# Patient Record
Sex: FEMALE | Race: OTHER
[De-identification: names, ages, dates, MRNs, and addresses within clinical notes are randomized per-mention and may not be internally consistent; named-entity substitution may affect disease eponyms.]

---

## 2020-03-17 ENCOUNTER — HOSPITAL ENCOUNTER (EMERGENCY)
Dept: HOSPITAL 15 - ER | Age: 20
Discharge: HOME | End: 2020-03-17
Payer: SELF-PAY

## 2020-03-17 VITALS — WEIGHT: 155 LBS | HEIGHT: 64 IN | BODY MASS INDEX: 26.46 KG/M2

## 2020-03-17 VITALS — SYSTOLIC BLOOD PRESSURE: 135 MMHG | DIASTOLIC BLOOD PRESSURE: 74 MMHG

## 2020-03-17 DIAGNOSIS — J06.9: Primary | ICD-10-CM

## 2025-03-10 ENCOUNTER — HOSPITAL ENCOUNTER (EMERGENCY)
Dept: HOSPITAL 15 - ER | Age: 25
Discharge: HOME | End: 2025-03-10
Payer: SELF-PAY

## 2025-03-10 VITALS — WEIGHT: 143.3 LBS | BODY MASS INDEX: 24.46 KG/M2 | HEIGHT: 64 IN

## 2025-03-10 VITALS
DIASTOLIC BLOOD PRESSURE: 77 MMHG | HEART RATE: 109 BPM | TEMPERATURE: 98.1 F | SYSTOLIC BLOOD PRESSURE: 131 MMHG | OXYGEN SATURATION: 98 % | RESPIRATION RATE: 18 BRPM

## 2025-03-10 VITALS — RESPIRATION RATE: 18 BRPM | HEART RATE: 109 BPM | OXYGEN SATURATION: 98 %

## 2025-03-10 DIAGNOSIS — N39.0: Primary | ICD-10-CM

## 2025-03-10 LAB
CRYSTALS UR QL AUTO: (no result) /HPF
PROT UR STRIP.AUTO-MCNC: (no result) MG/DL

## 2025-03-10 PROCEDURE — 81001 URINALYSIS AUTO W/SCOPE: CPT

## 2025-03-10 PROCEDURE — 81025 URINE PREGNANCY TEST: CPT

## 2025-03-10 RX ADMIN — ACETAMINOPHEN ONE MG: 325 TABLET ORAL at 10:36

## 2025-03-10 NOTE — ED.PDOC
GI ASSESSMENT


HPI Comments


24 y.o female presents to the ED for a chief complaint of lower abdominal pain 

that started 2-3 days ago but progressively worsened associated with dysuria. 

Patient describes pain as sharp, constant, non radiating and rating a 9/10 on 

the pain scale. Patient denies any nausea, vomiting, diarrhea, fever, chills, 

hematuria or rectal bleeding. Patient took an at home pregnancy test one week 

ago and resulted negative. LMC was around 2/14/25 but is unsure due to irregular

menstrual cycles. She denies any medical, surgical history or allergies. She 

reports occasional use of alcohol.


Chief Complaint:  Abdominal Pain


Time Seen by MD:  10:20


Primary Care Provider:  NONE


Reviewed Notes:  Nurses Notes, Medications, Allergies


Allergies:  


Coded Allergies:  


     NO KNOWN ALLERGIES (Unverified , 3/17/20)


Home Meds


Active Scripts


Ciprofloxacin Hcl (Cipro) 500 Mg Tab, 1 TAB PO BID, #14 TAB


   Prov:ZOË DURAN MD         3/10/25


Information Source:  Patient


Mode of Arrival:  Ambulatory


Timing:  Days (2-3)


Duration:  Since onset


Quality:  Sharp


Vomitus:  None


Stool:  Normal


Severity:  Moderate


Recent:  None


Recent Hx of:  None


Pain Location:  Suprapubic


Modifying Factors:  Nothing


Associated sign and symptoms:  Abdominal Pain





Past Medical History


PAST MEDICAL HISTORY:  Denies


Surgical History:  Denies all surgeries


GYN History:  Denies all GYN Hx





Family History


Family History:  Reviewed,noncontributory to illness





Social History


Smoker:  Non-Smoker


Alcohol:  Occasionally


Drugs:  Denies Drug Use


Lives In:  Home





Constitutional:  denies: chills, diaphoresis, fatigue, fever, malaise, sweats, 

weakness, others


EENTM:  denies: blurred vision, double vision, ear bleeding, ear discharge, ear 

drainage, ear pain, ear ringing, eye pain, eye redness, hearing loss, mouth 

pain, mouth swelling, nasal discharge, nose bleeding, nose congestion, nose 

pain, photophobia, tearing, throat pain, throat swelling, voice changes, others


Respiratory:  denies: cough, hemoptysis, orthopnea, SOB at rest, shortness of 

breath, SOB with excertion, stridor, wheezing, others


Cardiovascular:  denies: chest pain, dizzy spells, diaphoresis, Dyspnea on 

exertion, edema, irregular heart beat, left arm pain, lightheadedness, 

palpitations, PND, syncope, others


Gastrointestinal:  reports: abdominal pain; denies: abdomen distended, blood 

streaked bowels, constipated, diarrhea, dysphagia, difficulty swallowing, 

hematemesis, melena, nausea, poor appetite, poor fluid intake, rectal bleeding, 

rectal pain, vomiting, others


Genitourinary:  reports: dysuria; denies: abnormal vagina bleeding, burning, 

dyspareunia, flank pain, frequency, hematuria, incontinence, pain, pregnant, 

vagina discharge, urgency, others


Neurological:  denies: dizziness, fainting, headache, left sided numbness, left 

sided weakness, numbness, paresthesia, pre-existing deficit, right sided 

numbness, right sided weakness, seizure, speech problems, tingling, tremors, 

weakness, others


Musculoskeletal:  denies: back pain, gout, joint pain, joint swelling, muscle 

pain, muscle stiffness, neck pain, others


Integumetry:  denies: bruises, change in color, change in hair/nails, dryness, 

laceration, lesions, lumps, rash, wounds, others


Allergic/Immunocompromised:  denies: Difficulty Healing, Frequent Infections, 

Hives, Itching, others


Hematologic/Lymphatic:  denies: anemia, blood clots, easy bleeding, easy 

bruising, swollen glands, others


Endocrine:  denies: excessive hunger, excessive sweating, excessive thirst, 

excessive urination, flushing, intolerance to cold, intolerance to heat, 

unexplained weight gain, unexplained weight loss, others


Psychiatric:  denies: anxiety, bipolar disorder, depression, hopeless, panic 

disorder, schizophrenia, sleepless, suicidal, others


All Other Systems:  Reviewed and Negative





Physical Exam


General Appearance:  Mild Distress


HEENT:  Normal ENT Inspection, Pharynx Normal, TMs Normal


Neck:  Full Range of Motion, Non-Tender, Normal, Normal Inspection


Respiratory:  Chest Non-Tender, Lungs Clear, No Accessory Muscle Use, No 

Respiratory Distress, Normal Breath Sounds


Cardiovascular:  No Edema, No JVD, No Murmur, No Gallop, Normal Peripheral 

Pulses, Regular Rate/Rhythm


Breast Exam:  Deferred


Gastrointestinal:  No Organomegaly, No Pulsatile Mass, Normal Bowel Sounds, 

Soft, Suprapubic, Tenderness


Genitalia:  Deferred


Pelvic:  Deferred


Rectal:  Deferred


Extremities:  No calf tenderness, Normal capillary refill, Normal inspection, 

Normal range of motion, Non-tender, No pedal edema


Musculoskeletal :  


   Apperance:  Normal


Neurologic:  Alert, CNs II-XII nml as Tested, No Motor Deficits, Normal Affect, 

Normal Mood, No Sensory Deficits


Cerebellar Function:  Normal


Reflexes:  Normal


Skin:  Dry, Normal Color, Warm


Lymphatic:  No Adenopathy





Was a procedure done?


Was a procedure done?:  No





GI differential Dx


Differential Diagnosis:  Esophagitis, Gastroenteritis, Food Poisoning, 

Pregnancy, Viral





X-Ray, Labs, Meds, VS





                                   Vital Signs








  Date Time  Temp Pulse Resp B/P (MAP) Pulse Ox O2 Delivery O2 Flow Rate FiO2


 


3/10/25 10:40 98.1 109 18 131/77 (95) 98   





 98.1       


 


3/10/25 10:38  109 18  98 Room Air* 0 21


 


3/10/25 10:25 97.7 105 17 117/88 (98) 96   








                                       Lab








Test


 3/10/25


10:31 Range/Units


 


 


Urine Color Yellow  Yellow  


 


Urine Clarity Hazy H Clear  


 


Urine pH 5.5  5.0-9.0  


 


Urine Specific Gravity 1.030  1.001-1.035  


 


Urine Protein Trace H Negative  


 


Urine Ketones Negative  Negative  


 


Urine Blood Negative  Negative  /uL


 


Urine Nitrite Negative  Negative  


 


Urine Bilirubin Negative  Negative  


 


Urine Urobilinogen Normal  Negative  mg/dL


 


Urine Leukocyte Esterase 1+  Negative  /uL


 


Urine RBC 1  0 - 4  /hpf


 


Urine Microscopic WBC 4  0-5  /HPF


 


Urine Squamous Epithelial


Cells Mod 


 <5  /hpf





 


Urine Amorphous Crystals Few  None Seen  /hpf


 


Urine Bacteria Few H None Seen  /hpf


 


Urine Mucus Few  None Seen  


 


Urine Glucose Normal  Normal  mg/dL


 


Urine Pregnancy Test Negative  Negative  








                               Current Medications








 Medications


  (Trade)  Dose


 Ordered  Sig/Grayson


 Route  Start Time


 Stop Time Status Last Admin


 


 Acetaminophen


  (Tylenol Tablet)  650 mg  ONCE  ONCE


 PO  3/10/25 10:30


 3/10/25 10:31 DC 3/10/25 10:36








The urine test is positive for UTI


The patient was given acetaminophen 650 mg by mouth for the pain 


The pregnancy test is negative


The patient was being discharged with a diagnosis of UTI 


The patient will return to the emergency department's the condition worsens


The patient was given a prescription of Cipro


Time of 1ST Reevaluation:  10:28


Reevaluation 1ST:  Unchanged


Patient Education/Counseling:  Diagnosis, Treatment, Prognosis, Need For Follow 

Up


Family Education/Counseling:  No Family Present





Departure 1


Departure


Time of Disposition:  11:11


Impression:  


   Primary Impression:  


   Urinary tract infection


   Qualified Codes:  N30.00 - Acute cystitis without hematuria


Disposition:  01 HOME / SELF CARE / HOMELESS


Condition:  Fair


e-Prescriptions


Ciprofloxacin Hcl (Cipro) 500 Mg Tab


1 TAB PO BID, #14 TAB


   Prov: ZOË DURAN MD         3/10/25


Discharged With:  Self





Critical Care Note


Critical Care Time?:  No





Stability


Stability form required:  No





Heart Score


Heart Score:  








Heart Score Response (Comments) Value


 


History N/A 0


 


EKG N/A 0


 


Age N/A 0


 


Risk Factors N/A 0


 


Troponin N/A 0


 


Total  0














I personally scribed for ZOË DURAN MD ([email or web address]) on 3/10/25 at 10:28.  

Electronically submitted by Irena Briceno ([facility]).





ZOË DURAN MD              Mar 10, 2025 10:28

## 2025-03-25 ENCOUNTER — HOSPITAL ENCOUNTER (EMERGENCY)
Dept: HOSPITAL 15 - ER | Age: 25
Discharge: HOME | End: 2025-03-25
Payer: SELF-PAY

## 2025-03-25 VITALS
SYSTOLIC BLOOD PRESSURE: 133 MMHG | OXYGEN SATURATION: 97 % | TEMPERATURE: 98.9 F | DIASTOLIC BLOOD PRESSURE: 98 MMHG | HEART RATE: 109 BPM

## 2025-03-25 VITALS — RESPIRATION RATE: 14 BRPM

## 2025-03-25 VITALS — BODY MASS INDEX: 33.99 KG/M2 | HEIGHT: 64 IN | WEIGHT: 199.08 LBS

## 2025-03-25 DIAGNOSIS — E66.9: ICD-10-CM

## 2025-03-25 DIAGNOSIS — N85.9: Primary | ICD-10-CM

## 2025-03-25 DIAGNOSIS — Z32.02: ICD-10-CM

## 2025-03-25 DIAGNOSIS — Z87.440: ICD-10-CM

## 2025-03-25 LAB
ANION GAP SERPL CALCULATED.3IONS-SCNC: 10 MMOL/L (ref 5–15)
BUN SERPL-MCNC: 11 MG/DL (ref 9–23)
BUN/CREAT SERPL: 13.6 (ref 10–20)
CALCIUM SERPL-MCNC: 10 MG/DL (ref 8.7–10.4)
CHLORIDE SERPL-SCNC: 105 MMOL/L (ref 98–107)
CO2 SERPL-SCNC: 26 MMOL/L (ref 20–31)
GLUCOSE SERPL-MCNC: 92 MG/DL (ref 74–106)
HCT VFR BLD AUTO: 39.2 % (ref 36–46)
HGB BLD-MCNC: 13.4 G/DL (ref 12.2–16.2)
MCH RBC QN AUTO: 28.7 PG (ref 28–32)
MCV RBC AUTO: 84 FL (ref 80–100)
NRBC BLD QL AUTO: 0.2 %
POTASSIUM SERPL-SCNC: 3.7 MMOL/L (ref 3.5–5.1)
SODIUM SERPL-SCNC: 141 MMOL/L (ref 136–145)

## 2025-03-25 PROCEDURE — 85025 COMPLETE CBC W/AUTO DIFF WBC: CPT

## 2025-03-25 PROCEDURE — 74176 CT ABD & PELVIS W/O CONTRAST: CPT

## 2025-03-25 PROCEDURE — 36415 COLL VENOUS BLD VENIPUNCTURE: CPT

## 2025-03-25 PROCEDURE — 80048 BASIC METABOLIC PNL TOTAL CA: CPT

## 2025-03-25 PROCEDURE — 81001 URINALYSIS AUTO W/SCOPE: CPT

## 2025-03-25 PROCEDURE — 81025 URINE PREGNANCY TEST: CPT

## 2025-03-25 NOTE — DVH
Exam: CT CT AB PEL WO CON-NO ORAL OR IV



History: rlq pain



Comparison Study: None



TECHNIQUE: Multidetector CT of the abdomen and pelvis was performed from lung bases to pubic symphysi
s. Imaging was performed without IV contrast. Axial, coronal, and sagittal multiplanar reformats were
 obtained from the axial data set by the technologist.



RADIATION DOSE: DLP  711.5 mGy.cm; CTDI vol  13.5 mGy.



Findings:



Limited evaluation given noncontrast technique. 



Lungs: The lung bases are clear.



Heart: No cardiomegaly or pericardial effusion.



Liver: Unremarkable.



Gallbladder: Unremarkable.



Spleen: Unremarkable



Pancreas: Unremarkable



Adrenals: Unremarkable



Kidneys: Unremarkable



GI tract: Unremarkable.  Normal appendix.



: 10.7 x 9.4 cm lesion along the uterine fundus which is not well characterized given noncontrast t
echnique. An intrauterine device is noted in the uterine fundus.



Vasculature: Unremarkable



Lymphadenopathy: Absent



Peritoneum: No ascites



Musculoskeletal: Unremarkable



Soft tissues: Unremarkable



Impression: 



1. Limited evaluation given noncontrast technique. 



2. Normal appendix.



3. 10.7 x 9.4 cm uterine lesion which is not well characterized given noncontrast technique. Recommen
d pelvic ultrasound or contrast-enhanced MRI for further evaluation.



Electronically Signed by: Vira Howard at 03/25/2025 21:24:36 PM

## 2025-03-25 NOTE — ED.PDOC
History of Present Illness


HPI Comments


25 y/o obese F, with only history of occasional alcohol use, presents with c/o 

bilateral, lower quadrant abdominal pain, today. Patient endorses onset of 

symptoms that began, initially, with RLQ pain following recent visit for UTI 2x 

weeks ago. She reports on additional LLQ abdominal pain, today. Patient rates 

pain severity a 6/10. She also endorses on medication compliancy with 

antibiotics she was placed on following said UTI-related visit. She reports no 

recent injuries, sick contact, spoiled food, or additional relevant 

informations, with exception of her period being late than usual amidst recent 

negative at-home urine pregnancy test. Patient denies any nausea, vomiting, 

urinary symptoms, fever, chills, or other associated symptoms at this time.


Time Seen by MD:  20:20


Primary Care Provider:  none


Reviewed Notes:  Nurses Notes, Medications, Allergies


Allergies:  


Coded Allergies:  


     NO KNOWN ALLERGIES (Unverified , 3/17/20)


Home Meds


Active Scripts


Ciprofloxacin Hcl (Cipro) 500 Mg Tab, 1 TAB PO BID, #14 TAB


   Prov:ZOË DURAN MD         3/10/25


Information Source:  Patient


Mode of Arrival:  Ambulatory


Severity:  Moderate


Timing:  Weeks


Duration:  Since onset


Prehospital treatment:  Treatment (antibiotics )





Past Medical History


Past Medical History (Other):  


obesity


Surgical History:  Denies all surgeries


GYN History:  Denies all GYN Hx





Family History


Family History:  Reviewed,noncontributory to illness





Social History


Smoker:  Non-Smoker


Alcohol:  Occasionally


Drugs:  Denies Drug Use


Lives In:  Home





Constitutional:  denies: chills, diaphoresis, fatigue, fever, malaise, sweats, 

weakness, others


EENTM:  denies: blurred vision, double vision, ear bleeding, ear discharge, ear 

drainage, ear pain, ear ringing, eye pain, eye redness, hearing loss, mouth 

pain, mouth swelling, nasal discharge, nose bleeding, nose congestion, nose 

pain, photophobia, tearing, throat pain, throat swelling, voice changes, others


Respiratory:  denies: cough, hemoptysis, orthopnea, SOB at rest, shortness of 

breath, SOB with excertion, stridor, wheezing, others


Cardiovascular:  denies: chest pain, dizzy spells, diaphoresis, Dyspnea on 

exertion, edema, irregular heart beat, left arm pain, lightheadedness, palpit

ations, PND, syncope, others


Gastrointestinal:  reports: abdominal pain; denies: abdomen distended, blood 

streaked bowels, constipated, diarrhea, dysphagia, difficulty swallowing, 

hematemesis, melena, nausea, poor appetite, poor fluid intake, rectal bleeding, 

rectal pain, vomiting, others


Genitourinary:  denies: abnormal vagina bleeding, burning, dyspareunia, dysuria,

flank pain, frequency, hematuria, incontinence, pain, pregnant, vagina dischar

ge, urgency, others


Neurological:  denies: dizziness, fainting, headache, left sided numbness, left 

sided weakness, numbness, paresthesia, pre-existing deficit, right sided 

numbness, right sided weakness, seizure, speech problems, tingling, tremors, 

weakness, others


Musculoskeletal:  denies: back pain, gout, joint pain, joint swelling, muscle 

pain, muscle stiffness, neck pain, others


Integumetry:  denies: bruises, change in color, change in hair/nails, dryness, 

laceration, lesions, lumps, rash, wounds, others


Allergic/Immunocompromised:  denies: Difficulty Healing, Frequent Infections, 

Hives, Itching, others


Hematologic/Lymphatic:  denies: anemia, blood clots, easy bleeding, easy 

bruising, swollen glands, others


Endocrine:  denies: excessive hunger, excessive sweating, excessive thirst, 

excessive urination, flushing, intolerance to cold, intolerance to heat, 

unexplained weight gain, unexplained weight loss, others


Psychiatric:  denies: anxiety, bipolar disorder, depression, hopeless, panic 

disorder, schizophrenia, sleepless, suicidal, others


All Other Systems:  Reviewed and Negative





Physical Exam


General Appearance:  Moderate Distress


HEENT:  Normal ENT Inspection, Pharynx Normal, TMs Normal


Neck:  Full Range of Motion, Non-Tender, Normal, Normal Inspection


Respiratory:  Chest Non-Tender, Lungs Clear, No Accessory Muscle Use, No 

Respiratory Distress, Normal Breath Sounds


Cardiovascular:  No Edema, No JVD, No Murmur, No Gallop, Normal Peripheral 

Pulses, Regular Rate/Rhythm


Breast Exam:  Deferred


Gastrointestinal:  No Organomegaly, No Pulsatile Mass, Normal Bowel Sounds, RLQ,

Soft, Tenderness


Genitalia:  Deferred


Pelvic:  Deferred


Rectal:  Deferred


Extremities:  No calf tenderness, Normal capillary refill, Normal inspection, 

Normal range of motion, Non-tender, No pedal edema


Musculoskeletal :  


   Apperance:  Normal


Neurologic:  Alert, CNs II-XII nml as Tested, No Motor Deficits, Normal Affect, 

Normal Mood, No Sensory Deficits


Cerebellar Function:  Normal


Reflexes:  Normal


Skin:  Dry, Normal Color, Warm


Lymphatic:  No Adenopathy





Was a procedure done?


Was a procedure done?:  No





Differential Dx


Considerations may include:


nephrolithiasis, UTI, ovarian cysts, ovarian torsion, pregnancy, PID, viral 

syndrome, among others





X-Ray, Labs, Meds, VS





                                   Vital Signs








  Date Time  Temp Pulse Resp B/P (MAP) Pulse Ox O2 Delivery O2 Flow Rate FiO2


 


3/25/25 20:25 98.9 109 16 133/98 (110) 97   





 98.9       








                                       Lab








Test


 3/25/25


20:30 3/25/25


20:26 Range/Units


 


 


White Blood Count Pending    


 


Red Blood Count Pending    


 


Hemoglobin Pending    


 


Hematocrit Pending    


 


Mean Corpuscular Volume Pending    


 


Mean Corpuscular Hemoglobin Pending    


 


Mean Corpuscular Hemoglobin


Concent Pending 


 


  





 


Red Cell Distribution Width Pending    


 


Platelet Count Pending    


 


Mean Platelet Volume Pending    


 


Neutrophils (%) (Auto) Pending    


 


Lymphocytes (%) (Auto) Pending    


 


Monocytes (%) (Auto) Pending    


 


Basophils (%) (Auto) Pending    


 


Neutrophils # (Auto) Pending    


 


Lymphocytes # (Auto) Pending    


 


Monocytes # (Auto) Pending    


 


Sodium Level 141   136-145  mmol/L


 


Potassium Level 3.7   3.5-5.1  mmol/L


 


Chloride Level 105     mmol/L


 


Carbon Dioxide Level 26   20-31  mmol/L


 


Anion Gap 10   5-15  


 


Blood Urea Nitrogen 11   9-23  mg/dL


 


Creatinine


 0.81 


 


 0.550-1.02


mg/dL


 


Glomerular Filtration Rate


Calc 103 


 


 >90  mL/min





 


BUN/Creatinine Ratio 13.6   10.0-20.0  


 


Serum Glucose 92     mg/dL


 


Calcium Level 10.0   8.7-10.4  mg/dL


 


Urine Color  Light-yellow  Yellow  


 


Urine Clarity  Turbid H Clear  


 


Urine pH  6.0  5.0-9.0  


 


Urine Specific Gravity  1.023  1.001-1.035  


 


Urine Protein  Negative  Negative  


 


Urine Ketones  1+ H Negative  


 


Urine Blood  Negative  Negative  /uL


 


Urine Nitrite  Negative  Negative  


 


Urine Bilirubin  Negative  Negative  


 


Urine Urobilinogen  Normal  Negative  mg/dL


 


Urine Leukocyte Esterase  Trace  Negative  /uL


 


Urine RBC  2  0 - 4  /hpf


 


Urine Microscopic WBC  < 1  0-5  /HPF


 


Urine Squamous Epithelial


Cells 


 Mod 


 <5  /hpf





 


Urine Bacteria  None seen  None Seen  /hpf


 


Urine Glucose  Normal  Normal  mg/dL


 


Urine Pregnancy Test  Negative  Negative  





CT scan of the abdomen and pelvis shows:


Impression: 





1. Limited evaluation given noncontrast technique. 





2. Normal appendix.





3. 10.7 x 9.4 cm uterine lesion which is not well characterized given 

noncontrast technique. Recommend pelvic ultrasound or contrast-enhanced MRI for 

further evaluation.


The urine test is negative 


The patient's chemistry panel is within normal limits 


The patient was being discharged and will follow up with the primary care doctor


The patient will return to the emergency department's condition worsens.


The patient was told that she needs follow up with the primary care doctor


The patient return to the emergency department's the condition worsens


Images Reviewed?:  Images reviewed and evaluated by me


Time of 1ST Reevaluation:  20:50


Reevaluation 1ST:  Unchanged


Patient Education/Counseling:  Diagnosis, Treatment, Prognosis, Need For Follow 

Up


Family Education/Counseling:  No Family Present





Departure 1


Departure


Time of Disposition:  21:34


Impression:  


   Primary Impression:  


   Lesion of uterus


   Additional Impression:  


   Abdominal pain


   Qualified Codes:  R10.31 - Right lower quadrant pain


Disposition:  01 HOME / SELF CARE / HOMELESS


Admit to:  Med Surg


Condition:  Fair


Discharged With:  Self





Critical Care Note


Critical Care Time?:  No





Stability


Stability form required:  No





Heart Score


Heart Score:  








Heart Score Response (Comments) Value


 


History N/A 0


 


EKG N/A 0


 


Age N/A 0


 


Risk Factors N/A 0


 


Troponin N/A 0


 


Total  0














I personally scribed for ZOË DURAN MD ([email or web address]) on 3/25/25 at 20:27.  

Electronically submitted by Demetrius Diaz ([email or web address]).





ZOË DURAN MD              Mar 25, 2025 20:27